# Patient Record
Sex: MALE
[De-identification: names, ages, dates, MRNs, and addresses within clinical notes are randomized per-mention and may not be internally consistent; named-entity substitution may affect disease eponyms.]

---
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
## 2023-05-15 PROBLEM — Z00.00 ENCOUNTER FOR PREVENTIVE HEALTH EXAMINATION: Status: ACTIVE | Noted: 2023-05-15

## 2023-05-18 ENCOUNTER — APPOINTMENT (OUTPATIENT)
Dept: ORTHOPEDIC SURGERY | Facility: CLINIC | Age: 18
End: 2023-05-18
Payer: COMMERCIAL

## 2023-05-18 VITALS — HEIGHT: 73 IN | WEIGHT: 260 LBS | BODY MASS INDEX: 34.46 KG/M2

## 2023-05-18 DIAGNOSIS — Z78.9 OTHER SPECIFIED HEALTH STATUS: ICD-10-CM

## 2023-05-18 PROCEDURE — 72100 X-RAY EXAM L-S SPINE 2/3 VWS: CPT

## 2023-05-18 PROCEDURE — 73564 X-RAY EXAM KNEE 4 OR MORE: CPT | Mod: RT

## 2023-05-18 PROCEDURE — 99203 OFFICE O/P NEW LOW 30 MIN: CPT

## 2023-05-18 NOTE — PHYSICAL EXAM
[No bony abnormalities] : No bony abnormalities [NL (140)] : flexion 140 degrees [NL (0)] : extension 0 degrees [5___] : hamstring 5[unfilled]/5 [] : ligamentously stable [Right] : right knee [There are no fractures, subluxations or dislocations. No significant abnormalities are seen] : There are no fractures, subluxations or dislocations. No significant abnormalities are seen

## 2023-05-18 NOTE — ASSESSMENT
[FreeTextEntry1] : 17M with right sided lumbar radic\par \par Discussed anti-inflammatories, PT/HEP, and CSI. May consider obtaining MRI in the future if symptoms fail to improve or worsen. He is well informed and would like to proceed with Mobic and PT at this time. f/up in 6-8 weeks if symptoms fail to improve or worsen.

## 2023-05-18 NOTE — DISCUSSION/SUMMARY
[de-identified] : The patient was advised of the diagnosis.  The natural history of the pathology was explained in full to the patient in layman's terms. All questions were answered.  The risks and benefits of surgical and non-surgical treatment alternatives were explained in full to the patient.\par \par Entered by Arlet Rodney PA-C working under the supervision of Jose Fernandez MD. \par

## 2023-05-18 NOTE — HISTORY OF PRESENT ILLNESS
[Sudden] : sudden [5] : 5 [4] : 4 [Dull/Aching] : dull/aching [Radiating] : radiating [Shooting] : shooting [Household chores] : household chores [Rest] : rest [Full time] : Work status: full time [de-identified] : 5/18/23: 18yo M with right posterior knee, leg, and lumbar pain for the past week with no injury. He has tried NSAIDs, ice, heat, and rest with little relief. 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 Admits to radic down the right posterior leg with occasional N/T; denies weakness, b/b incontinence.  [] : no [FreeTextEntry1] : right knee  [FreeTextEntry3] : week ago  [FreeTextEntry5] : Patient woke up not being able to extend his leg  [FreeTextEntry6] : soreness  [FreeTextEntry7] : L-spine, leg . hip [de-identified] : certain motions  [de-identified] : [facility]

## 2023-05-22 ENCOUNTER — APPOINTMENT (OUTPATIENT)
Dept: ORTHOPEDIC SURGERY | Facility: CLINIC | Age: 18
End: 2023-05-22
Payer: COMMERCIAL

## 2023-05-22 ENCOUNTER — RESULT REVIEW (OUTPATIENT)
Age: 18
End: 2023-05-22

## 2023-05-22 VITALS — BODY MASS INDEX: 34.46 KG/M2 | WEIGHT: 260 LBS | HEIGHT: 73 IN

## 2023-05-22 DIAGNOSIS — M54.16 RADICULOPATHY, LUMBAR REGION: ICD-10-CM

## 2023-05-22 DIAGNOSIS — Z78.9 OTHER SPECIFIED HEALTH STATUS: ICD-10-CM

## 2023-05-22 DIAGNOSIS — M25.561 PAIN IN RIGHT KNEE: ICD-10-CM

## 2023-05-22 PROCEDURE — 99214 OFFICE O/P EST MOD 30 MIN: CPT

## 2023-05-22 NOTE — DISCUSSION/SUMMARY
[de-identified] : The patient was advised of the diagnosis.  The natural history of the pathology was explained in full to the patient in layman's terms. All questions were answered.  The risks and benefits of surgical and non-surgical treatment alternatives were explained in full to the patient.\par \par Entered by Arlet Rodney PA-C working under the supervision of Jose Fernandez MD. \par

## 2023-05-22 NOTE — PHYSICAL EXAM
[No bony abnormalities] : No bony abnormalities [NL (140)] : flexion 140 degrees [NL (0)] : extension 0 degrees [5___] : hamstring 5[unfilled]/5 [Right] : right knee [There are no fractures, subluxations or dislocations. No significant abnormalities are seen] : There are no fractures, subluxations or dislocations. No significant abnormalities are seen [] : no medial joint line tenderness [FreeTextEntry8] : Mild calf tenderness

## 2023-05-22 NOTE — ASSESSMENT
[FreeTextEntry1] : 17M with right sided lumbar radic\par \par Will obtain STAT Doppler RLE to r/o DVT. \par MRI R knee to eval for MMT and MRI L-spine to r/o HNP since his pain has worsened since last visit. \par MDP rx \par f/up after imaging

## 2023-05-22 NOTE — HISTORY OF PRESENT ILLNESS
[Sudden] : sudden [10] : 10 [4] : 4 [Dull/Aching] : dull/aching [Radiating] : radiating [Shooting] : shooting [Full time] : Work status: full time [de-identified] : 5/18/23: 16yo M with right posterior knee, leg, and lumbar pain for the past week with no injury. He has tried NSAIDs, ice, heat, and rest with little relief. 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 Admits to radic down the right posterior leg with occasional N/T; denies weakness, b/b incontinence. \par \par 5/22/23: Here for f/up R knee, L-spine, leg. He states the pain had worsened since last visit. He tried the Mobic with little relief. He had gone to outside urgent care since the pain had become severe, and he was rx Naproxen to mild relief. Having pain with ambulation. Admits to some calf pain.  [] : no [FreeTextEntry1] : right knee  [FreeTextEntry3] : 5/11/23 [FreeTextEntry5] : 16 y/o RHD M eval L spine R Knee. Pt presnets with 2 wks of acute atraumatic pain. DX of Lumbar Radic on 5/18/23. No physical therapy yet.  [FreeTextEntry6] : soreness  [FreeTextEntry7] : L-spine, leg . hip [de-identified] : None [de-identified] : [facility]

## 2023-05-24 ENCOUNTER — FORM ENCOUNTER (OUTPATIENT)
Age: 18
End: 2023-05-24

## 2023-05-25 ENCOUNTER — APPOINTMENT (OUTPATIENT)
Dept: MRI IMAGING | Facility: CLINIC | Age: 18
End: 2023-05-25
Payer: COMMERCIAL

## 2023-05-25 PROCEDURE — 72148 MRI LUMBAR SPINE W/O DYE: CPT

## 2023-05-25 PROCEDURE — 73721 MRI JNT OF LWR EXTRE W/O DYE: CPT | Mod: RT

## 2023-06-01 ENCOUNTER — APPOINTMENT (OUTPATIENT)
Dept: ORTHOPEDIC SURGERY | Facility: CLINIC | Age: 18
End: 2023-06-01
Payer: COMMERCIAL

## 2023-06-01 VITALS — BODY MASS INDEX: 34.46 KG/M2 | WEIGHT: 260 LBS | HEIGHT: 73 IN

## 2023-06-01 PROCEDURE — 99214 OFFICE O/P EST MOD 30 MIN: CPT

## 2023-06-01 NOTE — DATA REVIEWED
[MRI] : MRI [Right] : of the right [Knee] : knee [Report was reviewed and noted in the chart] : The report was reviewed and noted in the chart [I reviewed the films/CD and agree] : I reviewed the films/CD and agree [FreeTextEntry1] : Impression:\par 1. Moderate soft tissue swelling surrounding the knee with moderate effusion, and a large popliteal cyst which \par may have ruptured, with diffuse fluid layering posteromedially extending into the calf.\par 2. Complex posterior lateral meniscal tear with surrounding synovitis.\par 3. No acute osseous injury or ligament tear. \par 4. Clinical correlation regarding inflammatory arthropathy or Lyme disease is recommended. Clinical followup \par is recommended.

## 2023-06-01 NOTE — ASSESSMENT
[FreeTextEntry1] : 17M with right sided lumbar radic, R LMT, ruptured bakers cyst\par \par Continue NSAIDs for pain\par negative duplex\par MRI reviewed\par f/u Sports team re possible knee arthroscopy\par return as needed\par \par The patient was advised of the diagnosis. The natural history of the pathology was explained in full to the patient in layman's terms. All questions were answered. The risks and benefits of surgical and non-surgical treatment alternatives were explained in full to the patient. \par

## 2023-06-01 NOTE — DISCUSSION/SUMMARY
[de-identified] : The patient was advised of the diagnosis.  The natural history of the pathology was explained in full to the patient in layman's terms. All questions were answered.  The risks and benefits of surgical and non-surgical treatment alternatives were explained in full to the patient.\par \par Entered by Arlet Rodney PA-C working under the supervision of Jose Fernandez MD. \par

## 2023-06-01 NOTE — PHYSICAL EXAM
[NL (140)] : flexion 140 degrees [NL (0)] : extension 0 degrees [5___] : hamstring 5[unfilled]/5 [Right] : right knee [There are no fractures, subluxations or dislocations. No significant abnormalities are seen] : There are no fractures, subluxations or dislocations. No significant abnormalities are seen [] : no medial joint line tenderness [FreeTextEntry8] : Mild calf tenderness

## 2023-06-01 NOTE — HISTORY OF PRESENT ILLNESS
[6] : 6 [2] : 2 [Sharp] : sharp [de-identified] : 5/18/23: 18yo M with right posterior knee, leg, and lumbar pain for the past week with no injury. He has tried NSAIDs, ice, heat, and rest with little relief. 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 Admits to radic down the right posterior leg with occasional N/T; denies weakness, b/b incontinence. \par \par 5/22/23: Here for f/up R knee, L-spine, leg. He states the pain had worsened since last visit. He tried the Mobic with little relief. He had gone to outside urgent care since the pain had become severe, and he was rx Naproxen to mild relief. Having pain with ambulation. Admits to some calf pain. \par 6/1/23 f/u R knee MRI, pain has improved since last visit [FreeTextEntry1] : L spine, RT knee

## 2023-06-22 ENCOUNTER — APPOINTMENT (OUTPATIENT)
Dept: ORTHOPEDIC SURGERY | Facility: CLINIC | Age: 18
End: 2023-06-22
Payer: COMMERCIAL

## 2023-06-22 VITALS — BODY MASS INDEX: 34.46 KG/M2 | WEIGHT: 260 LBS | HEIGHT: 73 IN

## 2023-06-22 DIAGNOSIS — S83.281A OTHER TEAR OF LATERAL MENISCUS, CURRENT INJURY, RIGHT KNEE, INITIAL ENCOUNTER: ICD-10-CM

## 2023-06-22 PROCEDURE — 99214 OFFICE O/P EST MOD 30 MIN: CPT

## 2023-06-22 NOTE — DISCUSSION/SUMMARY
General [de-identified] : (Assessment and Plan)\par \par History, clinical examination and imaging were most consistent with:\par -right knee lateral meniscus tear\par -resolved lumbar radiculopathy \par \par The diagnosis was explained in detail. The potential non-surgical and surgical treatments were reviewed. The relative risks and benefits of each option were considered relative to the patient’s age, activity level, medical history, symptom severity and previously attempted treatments. \par \par The patient was advised to consult with their primary medical provider prior to initiation of any new medications to reduce the risk of adverse effects specific to their long-term home medications and medical history. The risk of gastrointestinal irritation and kidney injury specific to long-term NSAID use was discussed.   \par \par -Patient currently has no symptoms related to his right knee. He denies mechanical symptoms. We discussed the chondroprotective role of the meniscus and that meniscal deficiency could increase the risk of tear propagation and cartilage degeneration. Discussed the risks and benefits of meniscal repair including the post-surgical rehabilitation. \par -Patient expressed a preference for non-surgical treatment at this time. \par -Patient will proceed with formal PT to restore quad strength and improve function. \par -Meloxicam as needed for pain. \par -Follow up in 6 weeks. If pain or function worsens, we would consider arthroscopic meniscal surgery. \par \par (MDM) \par \par Problem Complexity\par -Moderate: chronic illness with exacerbation\par \par Risk\par -Moderate: prescription medication\par \par

## 2023-06-22 NOTE — IMAGING
[de-identified] : (Physical Exam: Knee)\par \par Laterality is right \par \par Patient is in no acute distress, alert and oriented\par Sensation is grossly intact to light touch in the foot\par Motor function is 5/5 in the foot\par Capillary refill is less than 2 seconds in the toes\par Lymphadenopathy is not present\par Peripheral edema is not present\par \par Skin is intact \par Effusion is not present \par Atrophy is not present\par Antalgic gait is not present \par \par Medial joint line tenderness is not present \par Lateral joint line tenderness is not present \par Patellar tenderness is not present \par Calf tenderness is not present \par \par Knee extension is 0\par Knee flexion is 135\par \par Quadriceps strength is 5/5\par Hamstring strength is 5/5\par \par Lachman is normal \par Posterior drawer is normal\par Varus stress stability is normal\par Valgus stress stability is normal\par \par Medial Diana test is normal\par Lateral Diana test is normal\par Patellofemoral grind test is normal\par Patellar apprehension test is normal\par  [Right] : right knee [All Views] : anteroposterior, lateral, skyline, and anteroposterior standing [There are no fractures, subluxations or dislocations. No significant abnormalities are seen] : There are no fractures, subluxations or dislocations. No significant abnormalities are seen

## 2023-06-22 NOTE — DATA REVIEWED
[Right] : of the right [Knee] : knee [MRI] : MRI [Lumbar Spine] : lumbar spine [Report was reviewed and noted in the chart] : The report was reviewed and noted in the chart [I reviewed the films/CD and agree] : I reviewed the films/CD and agree [FreeTextEntry1] : posterior horn lateral meniscus horizontal oblique undersurface tear, moderate effusion, medial meniscus intact, ACL/PCL intact, baker's cyst noted [FreeTextEntry2] : mild multi level degenerative changes without focal area of impingement

## 2023-06-22 NOTE — HISTORY OF PRESENT ILLNESS
[de-identified] : (History of Present Illness)\par \par Patient age in years is 18\par Occupation is  \par \par Body part causing symptoms is the rt knee\par Symptoms began 1 month ago, no injury but he noted swelling in the knee and calf\par He reports that he subsequently developed radicular symptoms \par he denies history of prior knee issues\par he reports he took mobic and rested\par symptoms are now resolved\par denies pain or mechanical symptoms in the knee or back\par Patient's condition is not associated with worker’s compensation, no-fault or interscholastic athletics

## 2023-06-29 ENCOUNTER — APPOINTMENT (OUTPATIENT)
Dept: ORTHOPEDIC SURGERY | Facility: CLINIC | Age: 18
End: 2023-06-29

## 2023-07-27 ENCOUNTER — APPOINTMENT (OUTPATIENT)
Dept: ORTHOPEDIC SURGERY | Facility: CLINIC | Age: 18
End: 2023-07-27

## 2024-02-27 ENCOUNTER — APPOINTMENT (OUTPATIENT)
Dept: ORTHOPEDIC SURGERY | Facility: CLINIC | Age: 19
End: 2024-02-27

## 2024-02-29 ENCOUNTER — APPOINTMENT (OUTPATIENT)
Dept: ORTHOPEDIC SURGERY | Facility: CLINIC | Age: 19
End: 2024-02-29
Payer: COMMERCIAL

## 2024-02-29 VITALS — BODY MASS INDEX: 33.8 KG/M2 | WEIGHT: 255 LBS | HEIGHT: 73 IN

## 2024-02-29 DIAGNOSIS — S83.242A OTHER TEAR OF MEDIAL MENISCUS, CURRENT INJURY, LEFT KNEE, INITIAL ENCOUNTER: ICD-10-CM

## 2024-02-29 DIAGNOSIS — F32.A ANXIETY DISORDER, UNSPECIFIED: ICD-10-CM

## 2024-02-29 DIAGNOSIS — F41.9 ANXIETY DISORDER, UNSPECIFIED: ICD-10-CM

## 2024-02-29 PROCEDURE — 73564 X-RAY EXAM KNEE 4 OR MORE: CPT | Mod: LT

## 2024-02-29 PROCEDURE — 99213 OFFICE O/P EST LOW 20 MIN: CPT

## 2024-02-29 RX ORDER — MELOXICAM 15 MG/1
15 TABLET ORAL
Qty: 30 | Refills: 0 | Status: DISCONTINUED | COMMUNITY
Start: 2023-05-18 | End: 2024-02-29

## 2024-02-29 RX ORDER — CARIPRAZINE 3 MG/1
3 CAPSULE, GELATIN COATED ORAL
Refills: 0 | Status: ACTIVE | COMMUNITY

## 2024-02-29 RX ORDER — DESVENLAFAXINE SUCCINATE 100 MG/1
100 TABLET, EXTENDED RELEASE ORAL
Refills: 0 | Status: ACTIVE | COMMUNITY

## 2024-02-29 RX ORDER — METHYLPREDNISOLONE 4 MG/1
4 TABLET ORAL
Qty: 1 | Refills: 2 | Status: DISCONTINUED | COMMUNITY
Start: 2023-05-22 | End: 2024-02-29

## 2024-02-29 RX ORDER — MELOXICAM 15 MG/1
15 TABLET ORAL
Qty: 30 | Refills: 0 | Status: DISCONTINUED | COMMUNITY
Start: 2023-06-22 | End: 2024-02-29

## 2024-02-29 RX ORDER — PROPRANOLOL HYDROCHLORIDE 10 MG/1
10 TABLET ORAL
Refills: 0 | Status: ACTIVE | COMMUNITY

## 2024-02-29 NOTE — ASSESSMENT
[FreeTextEntry1] : 18M p/w L knee MMT  f/u MRI L knee NSAIDs as needed for pain return after imaging   The patient was advised of the diagnosis. The natural history of the pathology was explained in full to the patient in layman's terms. All questions were answered. The risks and benefits of surgical and non-surgical treatment alternatives were explained in full to the patient.

## 2024-02-29 NOTE — PHYSICAL EXAM
[Left] : left knee [NL (140)] : flexion 140 degrees [NL (0)] : extension 0 degrees [5___] : hamstring 5[unfilled]/5 [Right] : right knee [There are no fractures, subluxations or dislocations. No significant abnormalities are seen] : There are no fractures, subluxations or dislocations. No significant abnormalities are seen [] : no medial joint line tenderness [FreeTextEntry8] : Mild calf tenderness

## 2024-02-29 NOTE — HISTORY OF PRESENT ILLNESS
[8] : 8 [3] : 3 [Dull/Aching] : dull/aching [Sharp] : sharp [Tightness] : tightness [Constant] : constant [Throbbing] : throbbing [Walking] : walking [Rest] : rest [Stairs] : stairs [Full time] : Work status: full time [de-identified] : 5/18/23: 18yo M with right posterior knee, leg, and lumbar pain for the past week with no injury. He has tried NSAIDs, ice, heat, and rest with little relief. 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 Admits to radic down the right posterior leg with occasional N/T; denies weakness, b/b incontinence.   5/22/23: Here for f/up R knee, L-spine, leg. He states the pain had worsened since last visit. He tried the Mobic with little relief. He had gone to outside urgent care since the pain had become severe, and he was rx Naproxen to mild relief. Having pain with ambulation. Admits to some calf pain.  6/1/23 f/u R knee MRI, pain has improved since last visit  2/29/24: 19yo M with left knee pain for the past 1.5 weeks after he had straightened it while sitting at a desk. States he felt a pop. Now notes occasional buckling of the knee. States the pain feels similar to the right knee that had been treated last year  [FreeTextEntry6] : weakness [] : no [FreeTextEntry9] : biofreeze, advil [de-identified] : marcelino

## 2024-03-01 ENCOUNTER — RESULT REVIEW (OUTPATIENT)
Age: 19
End: 2024-03-01

## 2024-03-05 ENCOUNTER — APPOINTMENT (OUTPATIENT)
Dept: ORTHOPEDIC SURGERY | Facility: CLINIC | Age: 19
End: 2024-03-05

## 2024-03-21 ENCOUNTER — APPOINTMENT (OUTPATIENT)
Dept: ORTHOPEDIC SURGERY | Facility: CLINIC | Age: 19
End: 2024-03-21
Payer: COMMERCIAL

## 2024-03-21 VITALS — WEIGHT: 255 LBS | HEIGHT: 73 IN | BODY MASS INDEX: 33.8 KG/M2

## 2024-03-21 PROCEDURE — 99214 OFFICE O/P EST MOD 30 MIN: CPT

## 2024-03-21 RX ORDER — MELOXICAM 15 MG/1
15 TABLET ORAL
Qty: 30 | Refills: 1 | Status: ACTIVE | COMMUNITY
Start: 2024-03-21 | End: 1900-01-01

## 2024-03-21 NOTE — PHYSICAL EXAM
[Left] : left knee [NL (140)] : flexion 140 degrees [NL (0)] : extension 0 degrees [5___] : hamstring 5[unfilled]/5 [] : ligamentously stable [Right] : right knee [There are no fractures, subluxations or dislocations. No significant abnormalities are seen] : There are no fractures, subluxations or dislocations. No significant abnormalities are seen [FreeTextEntry8] : Mild calf tenderness

## 2024-03-21 NOTE — HISTORY OF PRESENT ILLNESS
[4] : 4 [2] : 2 [Light duty] : Work status: light duty [de-identified] : 5/18/23: 16yo M with right posterior knee, leg, and lumbar pain for the past week with no injury. He has tried NSAIDs, ice, heat, and rest with little relief. 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 Admits to radic down the right posterior leg with occasional N/T; denies weakness, b/b incontinence.   5/22/23: Here for f/up R knee, L-spine, leg. He states the pain had worsened since last visit. He tried the Mobic with little relief. He had gone to outside urgent care since the pain had become severe, and he was rx Naproxen to mild relief. Having pain with ambulation. Admits to some calf pain.  6/1/23 f/u R knee MRI, pain has improved since last visit  2/29/24: 17yo M with left knee pain for the past 1.5 weeks after he had straightened it while sitting at a desk. States he felt a pop. Now notes occasional buckling of the knee. States the pain feels similar to the right knee that had been treated last year   3/21/24 f/u L knee MRI, feeling a bit better [de-identified] : home exercises [de-identified] : marcelino

## 2024-03-21 NOTE — ASSESSMENT
[FreeTextEntry1] : 18M p/w L knee bakers cyst  begin PT MRI L knee reviewed NSAIDs as needed for pain return as needed   The patient was advised of the diagnosis. The natural history of the pathology was explained in full to the patient in layman's terms. All questions were answered. The risks and benefits of surgical and non-surgical treatment alternatives were explained in full to the patient.

## 2024-04-25 ENCOUNTER — APPOINTMENT (OUTPATIENT)
Dept: ORTHOPEDIC SURGERY | Facility: CLINIC | Age: 19
End: 2024-04-25
Payer: COMMERCIAL

## 2024-04-25 VITALS — BODY MASS INDEX: 33.8 KG/M2 | HEIGHT: 73 IN | WEIGHT: 255 LBS

## 2024-04-25 DIAGNOSIS — M25.562 PAIN IN LEFT KNEE: ICD-10-CM

## 2024-04-25 DIAGNOSIS — M66.0 RUPTURE OF POPLITEAL CYST: ICD-10-CM

## 2024-04-25 PROCEDURE — 99214 OFFICE O/P EST MOD 30 MIN: CPT

## 2024-04-25 NOTE — DISCUSSION/SUMMARY
[de-identified] : The patient's current medication management of their orthopedic diagnosis was reviewed today:   (1) We discussed a comprehensive treatment plan that included possible pharmaceutical management involving the use of prescription strength medications including but not limited to options such as oral Naprosyn 500mg BID, once daily Meloxicam 15 mg, or 500-650 mg Tylenol versus over the counter oral medications and topical prescription NSAID Pennsaid vs over the counter Voltaren gel.   (2) There is a moderate risk of morbidity with further treatment, especially from use of prescription strength medications and possible side effects of these medications which include upset stomach with oral medications, skin reactions to topical medications and cardiac/renal issues with long term use.   (3) I recommended that the patient follow-up with their medical physician to discuss any significant specific potential issues with long term medication use such as interactions with current medications or with exacerbation of underlying medical comorbidities.   (4) The benefits and risks associated with use of injectable, oral or topical, prescription and over the counter anti-inflammatory medications were discussed with the patient. The patient voiced understanding of the risks including but not limited to bleeding, stroke, kidney dysfunction, heart disease, and were referred to the black box warning label for further information.  Prior to appointment and during encounter with patient extensive medical records were reviewed including but not limited to, hospital records, out patient records, imaging results, and lab data. During this appointment the patient was examined, diagnoses were discussed and explained in a face to face manner. In addition extensive time was spent reviewing aforementioned diagnostic studies. Counseling including abnormal image results, differential diagnoses, treatment options, risk and benefits, lifestyle changes, current condition, and current medications was performed. Patient's comments, questions, and concerns were address and patient verbalized understanding.

## 2024-04-25 NOTE — HISTORY OF PRESENT ILLNESS
[2] : 2 [0] : 0 [Full time] : Work status: full time [de-identified] : 5/18/23: 16yo M with right posterior knee, leg, and lumbar pain for the past week with no injury. He has tried NSAIDs, ice, heat, and rest with little relief. 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 Admits to radic down the right posterior leg with occasional N/T; denies weakness, b/b incontinence.   5/22/23: Here for f/up R knee, L-spine, leg. He states the pain had worsened since last visit. He tried the Mobic with little relief. He had gone to outside urgent care since the pain had become severe, and he was rx Naproxen to mild relief. Having pain with ambulation. Admits to some calf pain.  6/1/23 f/u R knee MRI, pain has improved since last visit  2/29/24: 17yo M with left knee pain for the past 1.5 weeks after he had straightened it while sitting at a desk. States he felt a pop. Now notes occasional buckling of the knee. States the pain feels similar to the right knee that had been treated last year   3/21/24 f/u L knee MRI, feeling a bit better  4/25/24: f/up L knee. has not been able to go to PT due to his work schedule. He had another acute flare-up of pain 3 days ago and went to outside urgent care.  [de-identified] : no treatment

## 2024-04-25 NOTE — ASSESSMENT
[FreeTextEntry1] : 18M p/w L knee bakers cyst  begin PT, gave him a new PT rx  Start the Mobic (sent by outside )  return as needed   The patient was advised of the diagnosis. The natural history of the pathology was explained in full to the patient in layman's terms. All questions were answered. The risks and benefits of surgical and non-surgical treatment alternatives were explained in full to the patient.

## 2025-05-20 ENCOUNTER — APPOINTMENT (OUTPATIENT)
Dept: ORTHOPEDIC SURGERY | Facility: CLINIC | Age: 20
End: 2025-05-20
Payer: COMMERCIAL

## 2025-05-20 DIAGNOSIS — M54.12 RADICULOPATHY, CERVICAL REGION: ICD-10-CM

## 2025-05-20 PROCEDURE — 99214 OFFICE O/P EST MOD 30 MIN: CPT

## 2025-05-20 PROCEDURE — 72070 X-RAY EXAM THORAC SPINE 2VWS: CPT

## 2025-05-20 PROCEDURE — 72040 X-RAY EXAM NECK SPINE 2-3 VW: CPT

## 2025-05-20 RX ORDER — PREDNISONE 10 MG/1
10 TABLET ORAL
Qty: 22 | Refills: 0 | Status: ACTIVE | COMMUNITY
Start: 2025-05-20 | End: 1900-01-01

## 2025-05-22 ENCOUNTER — RESULT REVIEW (OUTPATIENT)
Age: 20
End: 2025-05-22

## 2025-05-30 ENCOUNTER — APPOINTMENT (OUTPATIENT)
Dept: ORTHOPEDIC SURGERY | Facility: CLINIC | Age: 20
End: 2025-05-30
Payer: COMMERCIAL

## 2025-05-30 VITALS — BODY MASS INDEX: 33.8 KG/M2 | WEIGHT: 255 LBS | HEIGHT: 73 IN

## 2025-05-30 DIAGNOSIS — S29.019A STRAIN OF MUSCLE AND TENDON OF UNSPECIFIED WALL OF THORAX, INITIAL ENCOUNTER: ICD-10-CM

## 2025-05-30 PROCEDURE — 99204 OFFICE O/P NEW MOD 45 MIN: CPT

## 2025-06-25 ENCOUNTER — APPOINTMENT (OUTPATIENT)
Dept: ORTHOPEDIC SURGERY | Facility: CLINIC | Age: 20
End: 2025-06-25